# Patient Record
Sex: FEMALE | Race: WHITE | NOT HISPANIC OR LATINO | Employment: PART TIME | URBAN - METROPOLITAN AREA
[De-identification: names, ages, dates, MRNs, and addresses within clinical notes are randomized per-mention and may not be internally consistent; named-entity substitution may affect disease eponyms.]

---

## 2018-08-09 ENCOUNTER — AMBULATORY - HEALTHEAST (OUTPATIENT)
Dept: BEHAVIORAL HEALTH | Facility: CLINIC | Age: 48
End: 2018-08-09

## 2018-08-09 DIAGNOSIS — R41.9 COGNITIVE COMPLAINTS: ICD-10-CM

## 2018-08-10 ENCOUNTER — COMMUNICATION - HEALTHEAST (OUTPATIENT)
Dept: BEHAVIORAL HEALTH | Facility: CLINIC | Age: 48
End: 2018-08-10

## 2018-08-27 ENCOUNTER — COMMUNICATION - HEALTHEAST (OUTPATIENT)
Dept: BEHAVIORAL HEALTH | Facility: CLINIC | Age: 48
End: 2018-08-27

## 2018-08-29 ENCOUNTER — COMMUNICATION - HEALTHEAST (OUTPATIENT)
Dept: BEHAVIORAL HEALTH | Facility: CLINIC | Age: 48
End: 2018-08-29

## 2018-09-07 NOTE — TELEPHONE ENCOUNTER
FUTURE VISIT INFORMATION      FUTURE VISIT INFORMATION:    Date: 2018    Time: 8:00 am     [place] [facility]  [place] INFORMATION:    Referring provider:  Yasmany Trevino    RECORDS REQUESTED FROM:       Clinic name Comments Records Status Imaging Status   [facility]  Requested Records and Images on 2018 In- Coming  In-Coming    [facility]  Requested Records on 2018  Received  NA                              RECORDS STATUS        NOTES (FOR ALL VISITS) STATUS DETAILS   OFFICE NOTE from referring provider N/A    OFFICE NOTE from other specialist N/A    DISCHARGE SUMMARY from hospital N/A    DISCHARGE REPORT from the ER Care Everywhere 2018, 2014, 2013,    OPERATIVE REPORT N/A    MEDICATION LIST N/A    IMAGING  (FOR ALL VISITS)     EMG N/A    EEG Care Everywhere 2015  Elysia Cerna MD     3/3/2015 12:10 PM      MRN [id number] EEG DATE 2015   NAME Rohini Kay EEG #      1970       Requesting Provider Skyla Owen MD   Interpreting Physician Elysai Cerna MD    Read Date 3/3/2015       HISTORY Episodic confusion        MEDICATIONS Synthroid        DATA ACQUISITION This is a sleep-deprived, non-fasting scalp EEG   performed with video monitoring. The patient's data was acquired   digitally using 18 channels for EEG, 1 channel for EKG, and 2   channels for eye leads. Electrodes were placed using the   International 10-20 System of Electrode Placement. Condition:   good  Special Procedures: photic stimulation, hyperventilation        DESCRIPTION During the study, the patient was awake, alert,   drowsy, asleep.     Background rhythm is regular low to moderate amplitude, 9 Hz   alpha activity. The waveforms do attenuate with eye opening. The   waveforms are symmetric and synchronous.     The patient performs HV of 3 min duration with good effort does   not activate the record.  Photic stimulation of 1-25 hz does not   activate the  record.    The patient does transition to stage 1 sleep with loss of the   background rhythm and vertex sharp waves. Stage 2 sleep is not   seen. The patient does awaken later in the record with return to   alpha rhythms.     EKG rhythm is approximately 84 bpm. O2 sats are within normal   limits.     The patient demonstrates several intermittent abnormalities:  1. C3 sharp wave isolate at 09:12:05  2. High amplitude theta approximately 1 sec at 09:13:59  3. Intermittent sharp wave at T4 electrode, ex. At 09:15:37 and   09:19:46 early drowsiness.      EEG DIAGNOSIS This is an abnormal eeg. No epileptic activity was   seen. However, patient did have several epileptiform waveforms as   noted above. Although, this does not indicate epilepsy or   seizure, it could be a potential focus if clinically correlated.     ECT N/A    MRI (HEAD, NECK, SPINE) Care Everywhere  PACS  06/04/2012, 01/23/2015, 08/02/2018   CT (HEAD, NECK, SPINE) N/A    OTHER                 Medical Records Request For Appointment   (09/11/2018) Urgent!          To: [facility] From: Yrn Ivan  - Clinic Coordinator  [facility]   Fax: [phone] Fax: [phone]   Date: 09/11/2018 Phone: [phone]   Pages: 1 page  Mailing Address: [facility]  Attn: Yrn Ivan , Clinic Coordinator   [address] [place] [address], mail code [id number]  [place] [address]   Rohini Vo  MRN: [id number]  Female, 48 year old, 1970  fax medical records to fax # [phone] for patient s upcoming appointment in Neurology Clinic.      Patient is being seen for: visit regarding abnormal electropolygraphy, tired, symptoms involving cognitive functions and awareness per Pattie Mireles, referred by Yasmany Trevino, medical records at [facility] and [facility]      PLEASE SEND: TIME FRAME NEEDED:      Referring MD letter/note  2 Years     Related consuls, office notes 2 Years     ED/UC/Hospital  discharge notes 2 Years     Medication List Current     Neuro-Psych Testing   2 Years      Lumbar Puncture Test  2 Years     Neurodiagnostic Testing  2 Years     EEG/EMG/EKG/ECT (reports and tracing) 2 Years     Pertinent lab work  2 years      Genetic testing (if available) 2 Years     All imaging studies of the brain and spine (CT, MRI, XR, CTA, MRA, Carotid US)   Please send reports and images.    Please Push Images to PACS!!!!  Thanks Yrn Ivan Clinic Coordinator  5 Years     Please push images to [employer] PACS or mail the imaging disc  URGENT [employer] account #[id number] to the address above.  NON URGENT mail to the above address via [employer]        If no records related to Neurology, please send most recent lab work and physical.      Confidentiality Notice:  The document(s) accompanying this fax may contain protected health information under state and federal law and is legally privileged.  The information is only for the use of the intended recipient named above.  The recipient or person responsible for delivering this information is prohibited by law from disclosing this information without proper authorization to any other party, unless required to do so by law or regulation.  If you are not the intended recipient, you are hereby notified that any review, dissemination, distribution, or copying of this message is strictly prohibited.  If you have received this communication in error, please notify us immediately by telephone to arrange for the return or destruction of the faxed documents.  Thank you.

## 2018-09-11 ENCOUNTER — PRE VISIT (OUTPATIENT)
Dept: NEUROLOGY | Facility: CLINIC | Age: 48
End: 2018-09-11

## 2018-09-11 ENCOUNTER — OFFICE VISIT (OUTPATIENT)
Dept: NEUROLOGY | Facility: CLINIC | Age: 48
End: 2018-09-11
Payer: COMMERCIAL

## 2018-09-11 VITALS
SYSTOLIC BLOOD PRESSURE: 128 MMHG | BODY MASS INDEX: 20.32 KG/M2 | HEIGHT: 64 IN | HEART RATE: 103 BPM | WEIGHT: 119 LBS | OXYGEN SATURATION: 100 % | DIASTOLIC BLOOD PRESSURE: 87 MMHG | RESPIRATION RATE: 16 BRPM | TEMPERATURE: 98.1 F

## 2018-09-11 DIAGNOSIS — R41.0 CONFUSION: ICD-10-CM

## 2018-09-11 DIAGNOSIS — R41.0 CONFUSION: Primary | ICD-10-CM

## 2018-09-11 PROBLEM — E03.9 HYPOTHYROID: Status: ACTIVE | Noted: 2018-09-11

## 2018-09-11 PROBLEM — Z12.4 SCREENING FOR MALIGNANT NEOPLASM OF CERVIX: Status: ACTIVE | Noted: 2017-03-17

## 2018-09-11 PROBLEM — R63.0 ANOREXIA: Status: ACTIVE | Noted: 2017-04-06

## 2018-09-11 PROBLEM — K59.09 CHRONIC CONSTIPATION: Status: ACTIVE | Noted: 2017-04-06

## 2018-09-11 PROBLEM — R53.83 TIRED: Status: ACTIVE | Noted: 2017-04-06

## 2018-09-11 PROBLEM — T78.1XXA GASTROINTESTINAL FOOD SENSITIVITY: Status: ACTIVE | Noted: 2017-04-06

## 2018-09-11 PROBLEM — N92.1 MENORRHAGIA WITH IRREGULAR CYCLE: Status: ACTIVE | Noted: 2017-03-08

## 2018-09-11 PROBLEM — Z12.4 CERVICAL CANCER SCREENING: Status: ACTIVE | Noted: 2017-03-17

## 2018-09-11 PROBLEM — E06.3 HYPOTHYROIDISM DUE TO HASHIMOTO'S THYROIDITIS: Status: ACTIVE | Noted: 2017-03-29

## 2018-09-11 LAB
B BURGDOR IGG+IGM SER QL: 0.1 (ref 0–0.89)
VIT B12 SERPL-MCNC: 1322 PG/ML (ref 193–986)

## 2018-09-11 RX ORDER — OMEGA-3/DHA/EPA/FISH OIL 60 MG-90MG
1000 CAPSULE ORAL DAILY
COMMUNITY

## 2018-09-11 ASSESSMENT — ENCOUNTER SYMPTOMS
HOT FLASHES: 0
SMELL DISTURBANCE: 0
POLYDIPSIA: 1
COUGH: 0
HYPOTENSION: 0
SINUS CONGESTION: 0
WHEEZING: 0
FEVER: 1
NECK PAIN: 0
MUSCLE WEAKNESS: 1
JOINT SWELLING: 1
DECREASED LIBIDO: 0
HOARSE VOICE: 0
WEIGHT GAIN: 0
SEIZURES: 0
COUGH DISTURBING SLEEP: 0
SINUS PAIN: 0
PANIC: 0
SPUTUM PRODUCTION: 0
CHILLS: 1
SORE THROAT: 0
WEAKNESS: 1
HEMOPTYSIS: 0
NECK MASS: 0
TINGLING: 0
HEADACHES: 0
EXERCISE INTOLERANCE: 1
NERVOUS/ANXIOUS: 1
DIZZINESS: 1
FATIGUE: 1
DOUBLE VISION: 0
TROUBLE SWALLOWING: 0
INSOMNIA: 1
EYE REDNESS: 0
LEG PAIN: 0
BACK PAIN: 0
DEPRESSION: 1
NIGHT SWEATS: 1
ARTHRALGIAS: 1
ORTHOPNEA: 0
SWOLLEN GLANDS: 0
SKIN CHANGES: 0
DYSPNEA ON EXERTION: 0
MYALGIAS: 0
TASTE DISTURBANCE: 0
ALTERED TEMPERATURE REGULATION: 1
HYPERTENSION: 0
EYE PAIN: 0
SLEEP DISTURBANCES DUE TO BREATHING: 0
WEIGHT LOSS: 0
SHORTNESS OF BREATH: 1
POSTURAL DYSPNEA: 0
EYE IRRITATION: 0
STIFFNESS: 1
SYNCOPE: 0
INCREASED ENERGY: 1
DISTURBANCES IN COORDINATION: 1
SNORES LOUDLY: 0
MUSCLE CRAMPS: 0
LIGHT-HEADEDNESS: 1
TREMORS: 1
SPEECH CHANGE: 1
NAIL CHANGES: 0
DECREASED CONCENTRATION: 1
MEMORY LOSS: 1
DECREASED APPETITE: 1
POLYPHAGIA: 0
LOSS OF CONSCIOUSNESS: 0
BRUISES/BLEEDS EASILY: 1
POOR WOUND HEALING: 0
PALPITATIONS: 0
HALLUCINATIONS: 0
PARALYSIS: 0
NUMBNESS: 0
EYE WATERING: 0

## 2018-09-11 ASSESSMENT — PAIN SCALES - GENERAL: PAINLEVEL: NO PAIN (0)

## 2018-09-11 NOTE — NURSING NOTE
Depression Response    Patient completed the PHQ-9 assessment for depression and scored >9? Yes  Question 9 on the PHQ-9 was positive for suicidality? Yes  Is the patient already receiving treatment for depression? Yes  Patient would like to speak with behavioral health team ([facility] only)? No    I personally notified the following: visit provider    Behavioral Health/Social Work Contact Information     [facility]  Esteban Oliva MA, LMFT  Lead Behavioral Health Clinician  Phone: [phone]  [facility] Pager: [phone]    Non-[facility]  [facility] On-Call   Pager: [phone]

## 2018-09-11 NOTE — MR AVS SNAPSHOT
After Visit Summary   9/11/2018    Rohini Vo    MRN: [id number]           Patient Information     Date Of Birth          1970        Visit Information        Provider Department      9/11/2018 8:00 AM Hakan Davies MD [facility] Neurology        Today's Diagnoses     Confusion    -  1       Follow-ups after your visit        Additional Services     NEUROPSYCHOLOGY REFERRAL       Your provider has referred you to:  Neuropsychology  fro memory problems and also episode sof confusion  All scheduling is subject to the client's specific insurance plan & benefits, provider/location availability, and provider clinical specialities.  Please arrive 15 minutes early for your first appointment and bring your completed paperwork.    Please be aware that coverage of these services is subject to the terms and limitations of your health insurance plan.  Call member services at your health plan with any benefit or coverage questions.    Please bring the following to your appointment:  >>   Any x-rays, CTs or MRIs which have been performed.  Contact the facility where they were done to arrange for  prior to your scheduled appointment.  Any new CT, MRI or other procedures ordered by your specialist must be performed at a [facility] facility or coordinated by your clinic's referral office.    >>   List of current medications   >>   This referral request   >>   Any documents/labs given to you for this referral                  Follow-up notes from your care team     Return in about 3 months (around 12/11/2018).      Your next 10 appointments already scheduled     Sep 11, 2018  9:15 AM CDT   LAB with [facility])    [address] [place] [id number] [phone]           Please do not eat 10-12 hours before your appointment if you are coming in fasting for labs on lipids, cholesterol, or glucose (sugar). This does not apply to  pregnant women. Water, hot tea and black coffee (with nothing added) are okay. Do not drink other fluids, diet soda or chew gum.            Oct 10, 2018  8:00 AM CDT   (Arrive by 7:45 AM)   Ambulatory Hookup Visit with  EEG TECH 2   [facility] ([facility])    [address] [place] [address]   [phone]            Oct 11, 2018  8:00 AM CDT   (Arrive by 7:45 AM)   Ambulatory Discharge Visit with  EEG TECH 2   EE[facility] OUT[facility] ([facility])    [address] [place] [address]   [phone]            Dec 13, 2018  1:00 PM CST   (Arrive by 12:45 PM)   Return Visit with Hakan Davies MD   [facility] Neurology ([facility])    [address] [place] [id number] [phone]              Future tests that were ordered for you today     Open Future Orders        Priority Expected Expires Ordered    Thyroid peroxidase antibody Routine  9/11/2019 9/11/2018    Anti thyroglobulin antibody Routine  9/11/2019 9/11/2018    Vitamin B12 Routine  9/11/2019 9/11/2018    Angiotensin converting enzyme Routine  9/12/2019 9/11/2018    24 hour Ambulatory EEG (97707) Routine  9/11/2019 9/11/2018    Lyme Disease Samantha with reflex to WB Serum Routine  9/11/2019 9/11/2018            Who to contact     Please call your clinic at [phone] to:    Ask questions about your health    Make or cancel appointments    Discuss your medicines    Learn about your test results    Speak to your doctor            Additional Information About Your Visit        [employer] Information     [employer] is an electronic gateway that provides easy, online access to your medical records. With [employer]), you can request a clinic appointment, read your test results, renew a prescription or communicate with your care team.     To sign up for [employer] visit the website at [email or web address]   You will  "be asked to enter the access code listed below, as well as some personal information. Please follow the directions to create your username and password.     Your access code is: [email or web address]  Expires: 2018  6:31 AM     Your access code will  in 90 days. If you need help or a new code, please contact your [facility] or call [phone] for assistance.        Care EveryWhere ID     This is your Care EveryWhere ID. This could be used by other organizations to access your [facility] medical records  [id number]        Your Vitals Were     Pulse Temperature Respirations Height Pulse Oximetry Breastfeeding?    103 98.1  F (36.7  C) (Oral) 16 1.626 m (5' 4\") 100% No    BMI (Body Mass Index)                   20.43 kg/m2            Blood Pressure from Last 3 Encounters:   18 128/87    Weight from Last 3 Encounters:   18 54 kg (119 lb)              We Performed the Following     NEUROPSYCHOLOGY REFERRAL        Primary Care Provider Office Phone # Fax #    Mulu Trevino [phone]       [facility] [address] [place] [address]   [facility] [id number]        Equal Access to Services     JAVIER RESENDIZ AH: Hadii aad ku hadasho Soomaali, waaxda luqadaha, qaybta kaalmada adeegyada, waxay idiin hayadamarisn gonzález hutchisonarasin lakanikan ah. So [facility] [phone].    ATENCIÓN: Si habla español, tiene a mccauley disposición servicios gratuitos de asistencia lingüística. Llame al [phone].    We comply with applicable federal civil rights laws and [place] laws. We do not discriminate on the basis of race, color, national origin, age, disability, sex, sexual orientation, or gender identity.            Thank you!     Thank you for choosing [facility] NEUROLOGY  for your care. Our goal is always to provide you with excellent care. Hearing back from our patients is one way we can continue to improve our services. Please take a few minutes to complete the written survey that you may " receive in the mail after your visit with us. Thank you!             Your Updated Medication List - Protect others around you: Learn how to safely use, store and throw away your medicines at www.disposemymeds.org.          This list is accurate as of 9/11/18  9:00 AM.  Always use your most recent med list.                   Brand Name Dispense Instructions for use Diagnosis    ASHWAGANDHA PO      Take 800 mg by mouth    Confusion       B Complex Tabs      Take by mouth daily    Confusion       CHELATED IRON PO       Confusion       DIGESTIVE ENZYMES PO       Confusion       Fish Oil 500 MG Caps      1,000 mg daily    Confusion       MULTI VITAMIN PO      Take by mouth daily    Confusion       NATURE-THROID 130 MG Tabs   Generic drug:  Thyroid      Take 130 mg by mouth daily    Confusion

## 2018-09-11 NOTE — PROGRESS NOTES
Answers for HPI/ROS submitted by the patient on 9/11/2018   General Symptoms: Yes  Skin Symptoms: Yes  HENT Symptoms: Yes  EYE SYMPTOMS: Yes  HEART SYMPTOMS: Yes  LUNG SYMPTOMS: Yes  INTESTINAL SYMPTOMS: No  URINARY SYMPTOMS: No  GYNECOLOGIC SYMPTOMS: Yes  BREAST SYMPTOMS: No  SKELETAL SYMPTOMS: Yes  BLOOD SYMPTOMS: Yes  NERVOUS SYSTEM SYMPTOMS: Yes  MENTAL HEALTH SYMPTOMS: Yes  Fever: Yes  Loss of appetite: Yes  Weight loss: No  Weight gain: No  Fatigue: Yes  Night sweats: Yes  Chills: Yes  Increased stress: Yes  Excessive hunger: No  Excessive thirst: Yes  Feeling hot or cold when others believe the temperature is normal: Yes  Loss of height: No  Post-operative complications: No  Surgical site pain: No  Hallucinations: No  Change in or Loss of Energy: Yes  Hyperactivity: No  Confusion: Yes  Changes in hair: No  Changes in moles/birth marks: No  Itching: No  Rashes: No  Changes in nails: No  Acne: No  Hair in places you don't want it: No  Change in facial hair: No  Warts: No  Non-healing sores: No  Scarring: No  Flaking of skin: No  Color changes of hands/feet in cold : Yes  Sun sensitivity: Yes  Skin thickening: No  Ear pain: No  Ear discharge: No  Hearing loss: No  Tinnitus: Yes  Nosebleeds: No  Congestion: No  Sinus pain: No  Trouble swallowing: No   Voice hoarseness: No  Mouth sores: No  Sore throat: No  Tooth pain: No  Gum tenderness: No  Bleeding gums: No  Change in taste: No  Change in sense of smell: No  Dry mouth: Yes  Hearing aid used: No  Neck lump: No  Eye pain: No  Vision loss: No  Dry eyes: Yes  Watery eyes: No  Eye bulging: No  Double vision: No  Flashing of lights: No  Spots: No  Floaters: Yes  Redness: No  Crossed eyes: No  Tunnel Vision: No  Yellowing of eyes: No  Eye irritation: No  Cough: No  Sputum or phlegm: No  Coughing up blood: No  Difficulty breating or shortness of breath: Yes  Snoring: No  Wheezing: No  Difficulty breathing on exertion: No  Nighttime Cough: No  Difficulty breathing  when lying flat: No  Chest pain or pressure: Yes  Fast or irregular heartbeat: No  Pain in legs with walking: No  Trouble breathing while lying down: No  Fingers or toes appear blue: Yes  High blood pressure: No  Low blood pressure: No  Fainting: No  Murmurs: No  Pacemaker: No  Varicose veins: No  Edema or swelling: No  Wake up at night with shortness of breath: No  Light-headedness: Yes  Exercise intolerance: Yes  Back pain: No  Muscle aches: No  Neck pain: No  Swollen joints: Yes  Joint pain: Yes  Bone pain: No  Muscle cramps: No  Muscle weakness: Yes  Joint stiffness: Yes  Bone fracture: No  Anemia: No  Swollen glands: No  Easy bleeding or bruising: Yes  Trouble with coordination: Yes  Dizziness or trouble with balance: Yes  Fainting or black-out spells: No  Memory loss: Yes  Headache: No  Seizures: No  Speech problems: Yes  Tingling: No  Tremor: Yes  Weakness: Yes  Difficulty walking: No  Paralysis: No  Numbness: No  Bleeding or spotting between periods: No  Heavy or painful periods: Yes  Irregular periods: Yes  Vaginal discharge: No  Hot flashes: No  Vaginal dryness: No  Genital ulcers: No  Reduced libido: No  Painful intercourse: No  Difficulty with sexual arousal: No  Post-menopausal bleeding: No  Nervous or Anxious: Yes  Depression: Yes  Trouble sleeping: Yes  Trouble thinking or concentrating: Yes  Mood changes: Yes  Panic attacks: No  PHQ-2 Score: 2

## 2018-09-11 NOTE — NURSING NOTE
Chief Complaint   Patient presents with     Consult     [facility] NEW VISIT REGARDING ABNORMAL ELECTROPOLYGRAPHY, FATIGUE AND COGNITIVE/AWARENESS ISSUES.     Zeferino Mcintosh, EMT

## 2018-09-11 NOTE — LETTER
"2018       RE: Rohini Vo  [address]  [place] [address]     Dear Colleague,    Thank you for referring your patient, Rohini Vo, to the [facility] NEUROLOGY at [facility]. Please see a copy of my visit note below.      Service Date: 2018      Mulu Trevino MD   [facility]    [address] [place] [address]      RE: Rohini Vo   MRN: [id number]   : 1970      Dear Dr. Trevino:       This 48-year-old, very pleasant woman, accompanied by her mother, was evaluated in the General Neurology Clinic at the [facility] because of episodes, she says, since  of \"brain spells.\"      These episodes, she says, may last several hours.  The mother who is with her says she appears at times confused and not responding appropriately.  During this time, she says she has an inability to remember things.  She is able to walk and talk, but they are associated sometimes with muscle weakness and fatigue and may last up to 1 day, but as long as a week.  She forgets lists.  She has been incontinent at that time and cannot react appropriately.  They have been low grade.  Forgetfulness is also in between these episodes.  She has seen a neurologist, Dr. Skyla Owen in [place], and Dr. 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  She also has Hashimoto thyroiditis and is seen by Endocrinology here and is on a workup for possible also immune disease and saw Rheumatology.  She said she had an EEG in  at the [facility], which was abnormal, and she has been ___ there on .  She apparently has had what she says is a personality change, and she cannot focus.  She is irritable and depressed.  She has a history of a suicide attempt as a teenager and was briefly hospitalized, and " after that, she has intermittently suffered from depression, but without any suicidal history.  She is currently scheduled to see a therapist.      As far as seizures, she has no history of seizures in childhood or febrile convulsion.  She has not had any meningitis, encephalitis or head injury.  None of these episodes have been associated with tongue biting, tonic-clonic problems or any overt seizure activity.  The only medication she has now is B complex.      PAST MEDICAL HISTORY:  She carries diagnoses of abnormal EEG, hypothyroidism, anorexia, chronic constipation, GI food sensitivity, tiredness, hypothyroidism due to Hashimoto thyroiditis, malignant neoplasm of the cervix, menorrhagia with irregular cycles, cognitive changes.      CURRENT MEDICATIONS:  B complex, chelate iron, digestive enzyme, multivitamin, omega 3 and thyroid.        ALLERGIES:  Unknown to the patient or not listed.      FAMILY HISTORY:  Negative for neurological problems.      REVIEW OF SYSTEMS:  She was told as a teenager because of cold hands that she had Raynaud phenomenon.  This was not treated.      SOCIAL HISTORY:  She is working, but has difficulty because of the above symptoms.  She has a daughter who is 27 years old.  She is attending to the illness of a very close, best friend of hers, who has terminal emphysema of the lungs.        REVIEW OF SYSTEMS:  Not significant for items listed.      PHYSICAL EXAMINATION:  She is a pleasant woman.  Blood pressure is 120/87.  Pulse is 103.  Mental status exam looks very normal.  She denies any weight loss of significance lately.      Her cranial nerve exam is normal.  Fundi are well visualized and normal.  Normal eye movements.  Face is symmetrical.  Neck is supple.  There is good coordination of arms and legs.  She is left-handed.  Reflexes are obtainable, symmetrical.  Plantars are flexor.  Sensory exam is normal.      Gait and station are unremarkable.      In summary, she has a normal  neurological exam.  She complains of these episodes of confusion and memory loss.  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  Further review of outside records needs to be done.  She has had apparently an abnormal EEG.      That would be a concern for a possibility of seizures even though these episodes do not last too long a period of time.  For cognitive changes, we will need to set timing with neuropsych testing; we will try to schedule that.  She is scheduled for an ambulatory EEG to capture these episodes, neuropsych testing, and we will proceed with some laboratory studies, including vitamin B12 level, angiotensin converting enzyme, Lyme test and repeat her thyroid antibody profile.        We will see her for followup after the above.            D: 2018   T: 2018   MT: sandra      Name:     ELIAS WINSTON   MRN:      [id number]        Account:      [id number]   :      1970           Service Date: 2018      Document: [id number]       Again, thank you for allowing me to participate in the care of your patient.      Sincerely,    Hakan Davies MD

## 2018-09-11 NOTE — NURSING NOTE
PT. HAS BEEN EXPERIENCING DEPRESSION AND SUICIDAL THOUGHTS SINCE EXPERIENCING HER SYMPTOMS. PT STATES SHE IS PLANNING ON SEEING A THERAPIST SOON.     Zeferino Mcintosh, EMT

## 2018-09-12 LAB
ACE SERPL-CCNC: 29 U/L (ref 9–67)
THYROGLOB AB SERPL IA-ACNC: <20 IU/ML (ref 0–40)
THYROPEROXIDASE AB SERPL-ACNC: 111 IU/ML

## 2018-09-12 ASSESSMENT — PATIENT HEALTH QUESTIONNAIRE - PHQ9: SUM OF ALL RESPONSES TO PHQ QUESTIONS 1-9: 17

## 2018-09-17 NOTE — PROGRESS NOTES
"Service Date: 2018      Mulu Trevino MD   [facility]    [address] [place] [address]      RE: Rohini Vo   MRN: [id number]   : 1970      Dear Dr. Trevino:       This 48-year-old, very pleasant woman, accompanied by her mother, was evaluated in the General Neurology Clinic at the [facility] because of episodes, she says, since  of \"brain spells.\"      These episodes, she says, may last several hours.  The mother who is with her says she appears at times confused and not responding appropriately.  During this time, she says she has an inability to remember things.  She is able to walk and talk, but they are associated sometimes with muscle weakness and fatigue and may last up to 1 day, but as long as a week.  She forgets lists.  She has been incontinent at that time and cannot react appropriately.  They have been low grade.  Forgetfulness is also in between these episodes.  She has seen a neurologist, Dr. Skyla Owen in [place], and Dr. 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  She also has Hashimoto thyroiditis and is seen by Endocrinology here and is on a workup for possible also immune disease and saw Rheumatology.  She said she had an EEG in  at the [facility], which was abnormal, and she has been ___ there on .  She apparently has had what she says is a personality change, and she cannot focus.  She is irritable and depressed.  She has a history of a suicide attempt as a teenager and was briefly hospitalized, and after that, she has intermittently suffered from depression, but without any suicidal history.  She is currently scheduled to see a therapist.      As far as seizures, she has no history of seizures in childhood or febrile convulsion.  She has not had any meningitis, encephalitis or " head injury.  None of these episodes have been associated with tongue biting, tonic-clonic problems or any overt seizure activity.  The only medication she has now is B complex.      PAST MEDICAL HISTORY:  She carries diagnoses of abnormal EEG, hypothyroidism, anorexia, chronic constipation, GI food sensitivity, tiredness, hypothyroidism due to Hashimoto thyroiditis, malignant neoplasm of the cervix, menorrhagia with irregular cycles, cognitive changes.      CURRENT MEDICATIONS:  B complex, chelate iron, digestive enzyme, multivitamin, omega 3 and thyroid.        ALLERGIES:  Unknown to the patient or not listed.      FAMILY HISTORY:  Negative for neurological problems.      REVIEW OF SYSTEMS:  She was told as a teenager because of cold hands that she had Raynaud phenomenon.  This was not treated.      SOCIAL HISTORY:  She is working, but has difficulty because of the above symptoms.  She has a daughter who is 27 years old.  She is attending to the illness of a very close, best friend of hers, who has terminal emphysema of the lungs.        REVIEW OF SYSTEMS:  Not significant for items listed.      PHYSICAL EXAMINATION:  She is a pleasant woman.  Blood pressure is 120/87.  Pulse is 103.  Mental status exam looks very normal.  She denies any weight loss of significance lately.      Her cranial nerve exam is normal.  Fundi are well visualized and normal.  Normal eye movements.  Face is symmetrical.  Neck is supple.  There is good coordination of arms and legs.  She is left-handed.  Reflexes are obtainable, symmetrical.  Plantars are flexor.  Sensory exam is normal.      Gait and station are unremarkable.      In summary, she has a normal neurological exam.  She complains of these episodes of confusion and memory loss.  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  Further review of outside records needs to be done.  She has had apparently an abnormal  EEG.      That would be a concern for a possibility of seizures even though these episodes do not last too long a period of time.  For cognitive changes, we will need to set timing with neuropsych testing; we will try to schedule that.  She is scheduled for an ambulatory EEG to capture these episodes, neuropsych testing, and we will proceed with some laboratory studies, including vitamin B12 level, angiotensin converting enzyme, Lyme test and repeat her thyroid antibody profile.        We will see her for followup after the above.      Sincerely,      MD AISHWARYA Huff MD             D: 2018   T: 2018   MT: sandra      Name:     ELIAS WINSTON   MRN:      [id number]        Account:      [id number]   :      1970           Service Date: 2018      Document: [id number]

## 2018-10-10 ENCOUNTER — ALLIED HEALTH/NURSE VISIT (OUTPATIENT)
Dept: NEUROLOGY | Facility: CLINIC | Age: 48
End: 2018-10-10
Payer: COMMERCIAL

## 2018-10-10 DIAGNOSIS — R41.0 CONFUSION: ICD-10-CM

## 2018-10-10 NOTE — Clinical Note
Amb study hooked up this morning   It will be ready for interpretation tomorrow morning  Thanks  Sulema

## 2018-10-10 NOTE — MR AVS SNAPSHOT
After Visit Summary   10/10/2018    Rohini Vo    MRN: [id number]           Patient Information     Date Of Birth          1970        Visit Information        Provider Department      10/10/2018 8:00 AM [facility] EEG TECH 2 EEG [facility] OUTPATIENT        Today's Diagnoses     Confusion           Follow-ups after your visit        Your next 10 appointments already scheduled     Dec 13, 2018  1:00 PM CST   (Arrive by 12:45 PM)   Return Visit with Hakan Davies MD   [facility] Neurology ([facility] [place])    [address] [place] [id number] [phone]              Who to contact     Please call your clinic at [phone] to:    Ask questions about your health    Make or cancel appointments    Discuss your medicines    Learn about your test results    Speak to your doctor            Additional Information About Your Visit        MyChart Information     [employer] is an electronic gateway that provides easy, online access to your medical records. With [employer], you can request a clinic appointment, read your test results, renew a prescription or communicate with your care team.     To sign up for [employer] visit the website at [email or web address]   You will be asked to enter the access code listed below, as well as some personal information. Please follow the directions to create your username and password.     Your access code is: [email or web address]  Expires: 2019  7:43 AM     Your access code will  in 90 days. If you need help or a new code, please contact your [facility] or call [phone] for assistance.        Care EveryWhere ID     This is your Care EveryWhere ID. This could be used by other organizations to access your [place] medical records  [id number]         Blood Pressure from Last 3 Encounters:   No data found for BP    Weight from Last 3 Encounters:   No data found for Wt              Today, you had the  following     No orders found for display       Primary Care Provider Office Phone # Fax #    Mulu Trevino [phone]       [address]  [place] [address]        Equal Access to Services     GEOFFREY RESENDIZ : Hadii aad ku hadamberlyo Soomaali, waaxda luqadaha, qaybta kaalmada adeegyada, malik castillon gonzález cervantes laKaycarmela baxter. So [facility] [phone].    ATENCIÓN: Si habla español, tiene a mccauley disposición servicios gratuitos de asistencia lingüística. Llame al [phone].    We comply with applicable federal civil rights laws and [place] laws. We do not discriminate on the basis of race, color, national origin, age, disability, sex, sexual orientation, or gender identity.            Thank you!     Thank you for choosing [facility]  for your care. Our goal is always to provide you with excellent care. Hearing back from our patients is one way we can continue to improve our services. Please take a few minutes to complete the written survey that you may receive in the mail after your visit with us. Thank you!             Your Updated Medication List - Protect others around you: Learn how to safely use, store and throw away your medicines at www.disposemymeds.org.          This list is accurate as of 10/10/18 11:59 PM.  Always use your most recent med list.                   Brand Name Dispense Instructions for use Diagnosis    ASHWAGANDHA PO      Take 800 mg by mouth    Confusion       B Complex Tabs      Take by mouth daily    Confusion       CHELATED IRON PO       Confusion       DIGESTIVE ENZYMES PO       Confusion       Fish Oil 500 MG Caps      1,000 mg daily    Confusion       MULTI VITAMIN PO      Take by mouth daily    Confusion       NATURE-THROID 130 MG Tabs   Generic drug:  Thyroid      Take 130 mg by mouth daily    Confusion

## 2018-10-11 ENCOUNTER — ALLIED HEALTH/NURSE VISIT (OUTPATIENT)
Dept: NEUROLOGY | Facility: CLINIC | Age: 48
End: 2018-10-11
Payer: COMMERCIAL

## 2018-10-11 DIAGNOSIS — R94.01 ABNORMAL EEG: Primary | ICD-10-CM

## 2018-10-11 NOTE — MR AVS SNAPSHOT
After Visit Summary   10/11/2018    Rohini Vo    MRN: [id number]           Patient Information     Date Of Birth          1970        Visit Information        Provider Department      10/11/2018 8:00 AM  EEG TECH 2 EEG [facility] OUTPATIENT        Today's Diagnoses     Abnormal EEG    -  1       Follow-ups after your visit        Your next 10 appointments already scheduled     Dec 13, 2018  1:00 PM CST   (Arrive by 12:45 PM)   Return Visit with Hakan Davies MD   [facility] Neurology ([facility] [place])    [address] [place] [address] [phone]              Who to contact     Please call your clinic at [phone] to:    Ask questions about your health    Make or cancel appointments    Discuss your medicines    Learn about your test results    Speak to your doctor            Additional Information About Your Visit        MyChart Information     [employer] is an electronic gateway that provides easy, online access to your medical records. With [employer], you can request a clinic appointment, read your test results, renew a prescription or communicate with your care team.     To sign up for [employer] visit the website at [email or web address]   You will be asked to enter the access code listed below, as well as some personal information. Please follow the directions to create your username and password.     Your access code is: [email or web address]  Expires: 2019  7:43 AM     Your access code will  in 90 days. If you need help or a new code, please contact your [facility] or call [phone] for assistance.        Care EveryWhere ID     This is your Care EveryWhere ID. This could be used by other organizations to access your [place] medical records  [id number]         Blood Pressure from Last 3 Encounters:   No data found for BP    Weight from Last 3 Encounters:   No data found for Wt              Today, you  had the following     No orders found for display       Primary Care Provider Office Phone # Fax #    Mulu Trevino [phone]       [address]  [place] [address]        Equal Access to Services     GEOFFREY RESENDIZ : Hadii lucio ku hadamberlyo Soomaali, waaxda luqadaha, qaybta kaalmada adeegyada, malik arlethin hayaan guillerashad cervantes jose a baxter. So [facility] [phone].    ATENCIÓN: Si habla español, tiene a mccauley disposición servicios gratuitos de asistencia lingüística. [facility] [phone].    We comply with applicable federal civil rights laws and [place] laws. We do not discriminate on the basis of race, color, national origin, age, disability, sex, sexual orientation, or gender identity.            Thank you!     Thank you for choosing [facility]  for your care. Our goal is always to provide you with excellent care. Hearing back from our patients is one way we can continue to improve our services. Please take a few minutes to complete the written survey that you may receive in the mail after your visit with us. Thank you!             Your Updated Medication List - Protect others around you: Learn how to safely use, store and throw away your medicines at www.disposemymeds.org.          This list is accurate as of 10/11/18 11:59 PM.  Always use your most recent med list.                   Brand Name Dispense Instructions for use Diagnosis    ASHWAGANDHA PO      Take 800 mg by mouth    Confusion       B Complex Tabs      Take by mouth daily    Confusion       CHELATED IRON PO       Confusion       DIGESTIVE ENZYMES PO       Confusion       Fish Oil 500 MG Caps      1,000 mg daily    Confusion       MULTI VITAMIN PO      Take by mouth daily    Confusion       NATURE-THROID 130 MG Tabs   Generic drug:  Thyroid      Take 130 mg by mouth daily    Confusion

## 2018-10-12 NOTE — PROCEDURES
[facility] EEG #-1 (Day 1 of Ambulatory EEG Recording)    Name:     Rohini Vo   MRN: [id number]   : 1970   Procedure Date: 10/10/2018  Duration of Recordin hours, 47 minutes.      CLINICAL SUMMARY:  This diagnostic ambulatory EEG monitoring procedure was performed in evaluation of encephalopathy with episodic amnesia in Rohini Vo.  She reportedly did not receive anti-seizure medication during the period of monitoring.      TECHNICAL SUMMARY:  This continuous EEG monitoring procedure was performed with 23 scalp electrodes in 10-20 system placements, and additional scalp, precordial and other surface electrodes used for electrical referencing and artifact detection.      INTERICTAL EEG ACTIVITIES:  During maximal waking, there was a symmetric, well-modulated, approximately 10 Hz posterior dominant rhythm, which was attenuated on eye opening.  Lower amplitude faster activities predominated anteriorly.  Drowsiness was manifested by predominance of centrally maximum semirhythmic theta slowing and dropout of the posterior dominant rhythm during deeper drowsiness.  Symmetric sleep spindles were seen during slow wave sleep.      No interictal epileptiform abnormalities were recorded.    ICTAL RECORDINGS:  No electrographic seizures occurred during this procedure.     EVENT MARKINGS:  The patient activated the [employer] event marking system once during the period of monitoring.  At 01:13:35, she marked an event that she reported as being awakened by a jerk and being unable to fall asleep again.  The EEG recording at this time showed a sudden arousal from slow wave sleep into full waking, with sustained waking EEG activities for many minutes.  The patient did have other arousals from slow wave sleep during her overnight sleep period, but these were not marked by the patient and waking EEG activities were quite brief, well under 1 minute, before resumption of  drowsiness and slow wave sleep in these other instances.     SUMMARY OF AMBULATORY EEG MONITORING:    The interictal EEG recording was normal in waking, drowsiness and slow wave sleep.  No pathological slowing, no interictal epileptiform abnormalities and no electrographic seizures were recorded.    The patient marked 1 arousal from slow wave sleep, which was associated with sustained occurrence of waking EEG activities shortly after 1 a.m.; other arousals from slow wave sleep showed only brief waking EEG activities and were not marked by the patient.  Clinical correlation is recommended.   Maxim Vargas M.D., Professor of Neurology        D: 10/11/2018   T: 10/11/2018   MT: KACEY      Name:     ELIAS WINSTON   MRN:      [id number]        Account:        [id number]   :      1970           Procedure Date: 10/10/2018      Document: [id number]

## 2018-12-13 ENCOUNTER — OFFICE VISIT (OUTPATIENT)
Dept: NEUROLOGY | Facility: CLINIC | Age: 48
End: 2018-12-13
Payer: COMMERCIAL

## 2018-12-13 VITALS
WEIGHT: 123 LBS | TEMPERATURE: 98 F | SYSTOLIC BLOOD PRESSURE: 123 MMHG | DIASTOLIC BLOOD PRESSURE: 69 MMHG | BODY MASS INDEX: 21 KG/M2 | RESPIRATION RATE: 16 BRPM | OXYGEN SATURATION: 98 % | HEIGHT: 64 IN | HEART RATE: 99 BPM

## 2018-12-13 DIAGNOSIS — G93.41 METABOLIC ENCEPHALOPATHY: ICD-10-CM

## 2018-12-13 DIAGNOSIS — G93.41 METABOLIC ENCEPHALOPATHY: Primary | ICD-10-CM

## 2018-12-13 PROBLEM — R76.8 ANA POSITIVE: Status: ACTIVE | Noted: 2018-10-03

## 2018-12-13 RX ORDER — THYROID 90 MG/1
90 TABLET ORAL
COMMUNITY
Start: 2018-11-01

## 2018-12-13 RX ORDER — THYROID 15 MG/1
15 TABLET ORAL
COMMUNITY
Start: 2018-11-01

## 2018-12-13 RX ORDER — LEVOTHYROXINE, LIOTHYRONINE 57; 13.5 UG/1; UG/1
TABLET ORAL
Refills: 0 | COMMUNITY
Start: 2018-11-05

## 2018-12-13 ASSESSMENT — PAIN SCALES - GENERAL: PAINLEVEL: NO PAIN (0)

## 2018-12-13 ASSESSMENT — MIFFLIN-ST. JEOR: SCORE: 1167.92

## 2018-12-13 NOTE — NURSING NOTE
Chief Complaint   Patient presents with     RECHECK     [facility] RETURN - 3 month     Paris Robison, CMA

## 2018-12-13 NOTE — LETTER
2018       RE: Rohini Vo  [address]  [facility] [address]     Dear Colleague,    Thank you for referring your patient, Rohini Vo, to the [facility] NEUROLOGY at [facility]. Please see a copy of my visit note below.    Service Date: 2018      Mulu Trevino MD   [facility]   [address] [place] [address]      RE: Rohini Vo   MRN: [id number]   : 1970      Dear Dr. Trevino:      Once more we saw Ms. Rohini Vo, accompanied by her mother.  She had been evaluated neurologically on  with a history of episodes of confusion, memory loss and she had been evaluated also for forgetfulness and some rare incontinence.  She had been seen in [place] by Dr. Skyla Owen, and she has very high antithyroid antibody and the TPO, and she was given a trial of prednisone for Hashimoto's encephalopathy and she has a history of Hashimoto thyroiditis.  An immune workup has shown very high antinuclear antibody 1:1000.  She had a personality change.  She is irritable and depressed and has had a history of a suicide attempt, however, in high school years.  There has been no history of seizures, febrile convulsion, meningitis, encephalitis.  She has had an abnormal EEG on the outside which apparently they found some sharp waves.      When I saw her in the clinic in September, her neurological examination was normal.  She had episodes of confusion, memory loss and we did a 24-hour ambulatory EEG which was completely normal.  Neuropsych testing and laboratory studies were to follow.  She had a Lyme titer, B12, ACE level, all of which came back normal.  She has been seen by Rheumatology, with a positive VIKTOR and they also diagnosed her with microscopic colitis.  An MRI has been normal.  But 1 EEG in October at 3 hours has shown seizure activity.  She had a history is issues with digestion and had microscopic colitis  diagnosed.  Rheumatology's conclusion was that she had a history of anxiety, depression, Hashimoto's, hypothyroidism, microscopic colitis and celiac insensitivity and positive VIKTOR, SS-B, SM, RNP, dsDNA, C4 and normal.  There was no evidence of inflammatory arthritis, lupus rashes and ___ normal workup.  The final diagnosis was VIKTOR positivity and no indications to start therapy.  Patient returns and reports that these episodes of confusion are better.  The mother says also that they are better, but she is still not well.  She has gone back to work.  She has never had any really generalized convulsion.      She has not had a lumbar puncture done.  We reviewed the EEG with her which was normal.  We reexamined her.      PHYSICAL EXAMINATION:  She is a very pleasant individual.  She has a peculiar ability that she can turn her tongue on its side and so does also mother, who demonstrated to me and a grandmother.  Her blood pressure was 123/69, the O2 sats 98% and the weight is 123.  She is a very pleasant woman.  Eye movements are normal.  Fundus is normal.  There is no tremor.  Good coordination.  Brisk reflexes throughout.  No cranial nerve finding.  Generally, a normal neurological exam.      In summary, we have a patient complains of confusion and episodes, who actually has been improving.  Her TPO titer was only 111.  She does have significant elevated TSH and primary hypothyroidism.  She has very strong antinuclear antibody 1:1000.      We will repeat the TPO titer and the thyroid antiglobulin as well.  We considered a spinal fluid examination to rule out some central nervous system process.  She said she had a spider bite in her neck so we will check her for ehrlichiosis.  I will let her know fairly soon if she needs a spinal tap.  She has had a trial of steroids with no response.  We will let her know.      Sincerely,         Hakan Davies MD

## 2018-12-14 LAB — THYROPEROXIDASE AB SERPL-ACNC: 111 IU/ML

## 2018-12-15 LAB
A PHAGOCYTOPH IGG TITR SER IF: NORMAL {TITER}
A PHAGOCYTOPH IGM TITR SER IF: NORMAL {TITER}

## 2018-12-17 ENCOUNTER — TELEPHONE (OUTPATIENT)
Dept: NEUROLOGY | Facility: CLINIC | Age: 48
End: 2018-12-17

## 2018-12-17 NOTE — PROGRESS NOTES
Service Date: 2018      Mulu Trevino MD   [address] [address]      RE: Rohini Vo   MRN: [id number]   : 1970      Dear Dr. Trevino:      Once more we saw Ms. Rohini Vo, accompanied by her mother.  She had been evaluated neurologically on  with a history of episodes of confusion, memory loss and she had been evaluated also for forgetfulness and some rare incontinence.  She had been seen in [place] by Dr. Skyla Owen, and she has very high antithyroid antibody and the TPO, and she was given a trial of prednisone for Hashimoto's encephalopathy and she has a history of Hashimoto thyroiditis.  An immune workup has shown very high antinuclear antibody 1:1000.  She had a personality change.  She is irritable and depressed and has had a history of a suicide attempt, however, in high school years.  There has been no history of seizures, febrile convulsion, meningitis, encephalitis.  She has had an abnormal EEG on the outside which apparently they found some sharp waves.      When I saw her in the clinic in September, her neurological examination was normal.  She had episodes of confusion, memory loss and we did a 24-hour ambulatory EEG which was completely normal.  Neuropsych testing and laboratory studies were to follow.  She had a Lyme titer, B12, ACE level, all of which came back normal.  She has been seen by Rheumatology, with a positive VIKTOR and they also diagnosed her with microscopic colitis.  An MRI has been normal.  But 1 EEG in October at 3 hours has shown seizure activity.  She had a history is issues with digestion and had microscopic colitis diagnosed.  Rheumatology's conclusion was that she had a history of anxiety, depression, Hashimoto's, hypothyroidism, microscopic colitis and celiac insensitivity and positive VIKTOR, SS-B, SM, RNP, dsDNA, C4 and normal.  There was no evidence of inflammatory arthritis, lupus rashes and ___  normal workup.  The final diagnosis was VIKTOR positivity and no indications to start therapy.  Patient returns and reports that these episodes of confusion are better.  The mother says also that they are better, but she is still not well.  She has gone back to work.  She has never had any really generalized convulsion.      She has not had a lumbar puncture done.  We reviewed the EEG with her which was normal.  We reexamined her.      PHYSICAL EXAMINATION:  She is a very pleasant individual.  She has a peculiar ability that she can turn her tongue on its side and so does also mother, who demonstrated to me and a grandmother.  Her blood pressure was 123/69, the O2 sats 98% and the weight is 123.  She is a very pleasant woman.  Eye movements are normal.  Fundus is normal.  There is no tremor.  Good coordination.  Brisk reflexes throughout.  No cranial nerve finding.  Generally, a normal neurological exam.      In summary, we have a patient complains of confusion and episodes, who actually has been improving.  Her TPO titer was only 111.  She does have significant elevated TSH and primary hypothyroidism.  She has very strong antinuclear antibody 1:1000.      We will repeat the TPO titer and the thyroid antiglobulin as well.  We considered a spinal fluid examination to rule out some central nervous system process.  She said she had a spider bite in her neck so we will check her for ehrlichiosis.  I will let her know fairly soon if she needs a spinal tap.  She has had a trial of steroids with no response.  We will let her know.      Sincerely,         MD AISHWARYA Huff MD             D: 2018   T: 2018   MT: al      Name:     ELIAS WINSTON   MRN:      [id number]        Account:      [id number]   :      1970           Service Date: 2018      Document: [id number]

## 2018-12-18 ENCOUNTER — TELEPHONE (OUTPATIENT)
Dept: NEUROLOGY | Facility: CLINIC | Age: 48
End: 2018-12-18

## 2018-12-18 DIAGNOSIS — G93.40 ENCEPHALOPATHY: Primary | ICD-10-CM

## 2018-12-18 NOTE — TELEPHONE ENCOUNTER
Spoke with patient about LP and if she had any prior back surgeries or any issues related to her spine/back in the past. Patient denied this. I asked if she takes any blood thinners or is on aspirin and she confirmed she takes aspirin. I asked her to stop taking that today (needs to be off this 5 days prior to), she confirmed her understanding. I scheduled patient to come in to see Bredna for the LP on 12/21/2018 at 0930.

## 2018-12-18 NOTE — TELEPHONE ENCOUNTER
Asked the patient to come in 30 minutes prior to and have her blood drawn in lab for her platelets, INR and PTT prior to LP. Patient confirmed her understanding.

## 2018-12-21 ENCOUNTER — OFFICE VISIT (OUTPATIENT)
Dept: NEUROLOGY | Facility: CLINIC | Age: 48
End: 2018-12-21
Payer: COMMERCIAL

## 2018-12-21 VITALS
RESPIRATION RATE: 16 BRPM | BODY MASS INDEX: 20.49 KG/M2 | SYSTOLIC BLOOD PRESSURE: 120 MMHG | HEART RATE: 73 BPM | DIASTOLIC BLOOD PRESSURE: 50 MMHG | TEMPERATURE: 98 F | WEIGHT: 120 LBS | OXYGEN SATURATION: 100 % | HEIGHT: 64 IN

## 2018-12-21 DIAGNOSIS — G93.40 ENCEPHALOPATHY: ICD-10-CM

## 2018-12-21 LAB
APPEARANCE CSF: CLEAR
APTT PPP: 25 SEC (ref 22–37)
COLOR CSF: COLORLESS
ERYTHROCYTE [DISTWIDTH] IN BLOOD BY AUTOMATED COUNT: 13.7 % (ref 10–15)
GLUCOSE CSF-MCNC: 57 MG/DL (ref 40–70)
HCT VFR BLD AUTO: 41.7 % (ref 35–47)
HGB BLD-MCNC: 13.5 G/DL (ref 11.7–15.7)
INR PPP: 0.92 (ref 0.86–1.14)
MCH RBC QN AUTO: 31 PG (ref 26.5–33)
MCHC RBC AUTO-ENTMCNC: 32.4 G/DL (ref 31.5–36.5)
MCV RBC AUTO: 96 FL (ref 78–100)
PLATELET # BLD AUTO: 225 10E9/L (ref 150–450)
PROT CSF-MCNC: 30 MG/DL (ref 15–60)
RBC # BLD AUTO: 4.36 10E12/L (ref 3.8–5.2)
RBC # CSF MANUAL: 0 /UL (ref 0–2)
TUBE # CSF: 3 #
WBC # BLD AUTO: 5.1 10E9/L (ref 4–11)
WBC # CSF MANUAL: 0 /UL (ref 0–5)

## 2018-12-21 ASSESSMENT — MIFFLIN-ST. JEOR: SCORE: 1154.32

## 2018-12-21 ASSESSMENT — PAIN SCALES - GENERAL: PAINLEVEL: NO PAIN (0)

## 2018-12-21 NOTE — LETTER
2018       RE: Rohini Vo  [address]  [facility] [address]     Dear Colleague,    Thank you for referring your patient, Rohini Vo, to the [facility] NEUROLOGY at [facility]. Please see a copy of my visit note below.    [facility] NEUROLOGY  [address] [facility]  [address] [facility] [address]  Dept: [phone]  ______________________________________________________________________________    Patient: Rohini Vo   : 1970   MRN: [id number]   2018    INVASIVE PROCEDURE SAFETY CHECKLIST    Date: 2018   Procedure:lumbar puncture  Patient Name: Rohini Vo  MRN: [id number]  YOB: 1970    Action: Complete sections as appropriate. Any discrepancy results in a HARD COPY until resolved.     PRE PROCEDURE:  Patient ID verified with 2 identifiers (name and  or MRN): Yes  Procedure and site verified with patient/designee (when able): Yes  Accurate consent documentation in medical record: Yes  H&P (or appropriate assessment) documented in medical record: Yes  H&P must be up to 20 days prior to procedure and updates within 24 hours of procedure as applicable: NA  Relevant diagnostic and radiology test results appropriately labeled and displayed as applicable: NA  Procedure site(s) marked with provider initials: NA    TIMEOUT:  Time-Out performed immediately prior to starting procedure, including verbal and active participation of all team members addressing the following:Yes  * Correct patient identify  * Confirmed that the correct side and site are marked  * An accurate procedure consent form  * Agreement on the procedure to be done  * Correct patient position  * Relevant images and results are properly labeled and appropriately displayed  * The need to administer antibiotics or fluids for irrigation purposes during the procedure as applicable   * Safety precautions based on patient history or medication  use    DURING PROCEDURE: Verification of correct person, site, and procedures any time the responsibility for care of the patient is transferred to another member of the care team.         PURPOSE OF VISIT: Diagnostic lumbar puncture.   REQUESTING PHYSICIAN: Dr Davies  INDICATION: Clinical evaluation of encephalopathy  CONTRAINDICATIONS: This individual has no sensitivity to topical iodine or allergy to local anesthetic. She is not on any anticoagulation medications. She has no history of bleeding diathesis or coagulopathy.   CONSENT: This was obtained directly from the patient as documented on the [facility] Affirmation of Consent Surgery or Invasive Procedure.   PATIENT SAFETY: Invasive procedure safety check was successfully completed.   : Brenda Arnett CNP  LABS REVIEWED:   Ref. Range 12/21/2018 08:43   WBC Latest Ref Range: 4.0 - 11.0 10e9/L 5.1   Hemoglobin Latest Ref Range: 11.7 - 15.7 g/dL 13.5   Hematocrit Latest Ref Range: 35.0 - 47.0 % 41.7   Platelet Count Latest Ref Range: 150 - 450 10e9/L 225   RBC Count Latest Ref Range: 3.8 - 5.2 10e12/L 4.36   MCV Latest Ref Range: 78 - 100 fl 96   MCH Latest Ref Range: 26.5 - 33.0 pg 31.0   MCHC Latest Ref Range: 31.5 - 36.5 g/dL 32.4   RDW Latest Ref Range: 10.0 - 15.0 % 13.7   INR Latest Ref Range: 0.86 - 1.14  0.92   PTT Latest Ref Range: 22 - 37 sec 25     DESCRIPTION: Rohini Vo was positioned in the left lateral decubitus. The lumbar skin was prepared with an iodine-containing solution at the mid lumbar level. Three cubic centimeters of 1% Lidocaine was infiltrated locally at the L4-L5 interspace level. The lumbar subarachnoid space was  entered on the first pass by utilizing a 3-1/2 inch, 22 gauge, pencil-point Nelson needle.  There was an initial show of clear CSF.  The opening pressure was 8 cm CSF. The CSF meniscus exhibited good respiratory and cardiac dynamics. Nine milliliters of spinal fluid was removed and sent to the  lab for tests.   Dr. Davies requested CSF: protein, glucose, cell count with differential, ACE, cytology, oligoclonal bands, myelin basic protein, B.Burgdorferi, Lyme IgG and IgM, CSF culture, gram stain.   COMPLICATIONS: None were identified.   HEALTH LITERACY: The concept and management of post lumbar puncture headache were discussed with the patient   DISPOSITION: Study results will be conveyed to the patient by Dr. Davies    This procedure was performed under a hospital privileging agreement with Dr. Steel, Neurologist     Brenda RUST, [facility]  Neurology Clinic

## 2018-12-21 NOTE — PROGRESS NOTES
[facility] NEUROLOGY  [address] [place] [id number]  Dept: [phone]  ______________________________________________________________________________    Patient: Rohini Vo   : 1970   MRN: [id number]   2018    INVASIVE PROCEDURE SAFETY CHECKLIST    Date: 2018   Procedure:lumbar puncture  Patient Name: Rohini Vo  MRN: [id number]  YOB: 1970    Action: Complete sections as appropriate. Any discrepancy results in a HARD COPY until resolved.     PRE PROCEDURE:  Patient ID verified with 2 identifiers (name and  or MRN): Yes  Procedure and site verified with patient/designee (when able): Yes  Accurate consent documentation in medical record: Yes  H&P (or appropriate assessment) documented in medical record: Yes  H&P must be up to 20 days prior to procedure and updates within 24 hours of procedure as applicable: NA  Relevant diagnostic and radiology test results appropriately labeled and displayed as applicable: NA  Procedure site(s) marked with provider initials: NA    TIMEOUT:  Time-Out performed immediately prior to starting procedure, including verbal and active participation of all team members addressing the following:Yes  * Correct patient identify  * Confirmed that the correct side and site are marked  * An accurate procedure consent form  * Agreement on the procedure to be done  * Correct patient position  * Relevant images and results are properly labeled and appropriately displayed  * The need to administer antibiotics or fluids for irrigation purposes during the procedure as applicable   * Safety precautions based on patient history or medication use    DURING PROCEDURE: Verification of correct person, site, and procedures any time the responsibility for care of the patient is transferred to another member of the care team.         PURPOSE OF VISIT: Diagnostic lumbar puncture.   REQUESTING PHYSICIAN: Dr Davies  INDICATION:  Clinical evaluation of encephalopathy  CONTRAINDICATIONS: This individual has no sensitivity to topical iodine or allergy to local anesthetic. She is not on any anticoagulation medications. She has no history of bleeding diathesis or coagulopathy.   CONSENT: This was obtained directly from the patient as documented on the [facility] Affirmation of Consent Surgery or Invasive Procedure.   PATIENT SAFETY: Invasive procedure safety check was successfully completed.   : Brenda Arnett CNP  LABS REVIEWED:   Ref. Range 12/21/2018 08:43   WBC Latest Ref Range: 4.0 - 11.0 10e9/L 5.1   Hemoglobin Latest Ref Range: 11.7 - 15.7 g/dL 13.5   Hematocrit Latest Ref Range: 35.0 - 47.0 % 41.7   Platelet Count Latest Ref Range: 150 - 450 10e9/L 225   RBC Count Latest Ref Range: 3.8 - 5.2 10e12/L 4.36   MCV Latest Ref Range: 78 - 100 fl 96   MCH Latest Ref Range: 26.5 - 33.0 pg 31.0   MCHC Latest Ref Range: 31.5 - 36.5 g/dL 32.4   RDW Latest Ref Range: 10.0 - 15.0 % 13.7   INR Latest Ref Range: 0.86 - 1.14  0.92   PTT Latest Ref Range: 22 - 37 sec 25     DESCRIPTION: Rohini Vo was positioned in the left lateral decubitus. The lumbar skin was prepared with an iodine-containing solution at the mid lumbar level. Three cubic centimeters of 1% Lidocaine was infiltrated locally at the L4-L5 interspace level. The lumbar subarachnoid space was  entered on the first pass by utilizing a 3-1/2 inch, 22 gauge, pencil-point Nelson needle.  There was an initial show of clear CSF.  The opening pressure was 8 cm CSF. The CSF meniscus exhibited good respiratory and cardiac dynamics. Nine milliliters of spinal fluid was removed and sent to the lab for tests.   Dr. Davies requested CSF: protein, glucose, cell count with differential, ACE, cytology, oligoclonal bands, myelin basic protein, B.Burgdorferi, Lyme IgG and IgM, CSF culture, gram stain.   COMPLICATIONS: None were identified.   HEALTH LITERACY: The concept and  management of post lumbar puncture headache were discussed with the patient   DISPOSITION: Study results will be conveyed to the patient by Dr. Davies    This procedure was performed under a hospital privileging agreement with Dr. Steel, Neurologist     Brenda RUST, [facility]  Neurology Clinic

## 2018-12-23 LAB — ACE CSF-CCNC: 1.1 U/L (ref 0–2.5)

## 2018-12-24 LAB
ALB CSF/SERPL: 3.5 RATIO (ref 0–9)
ALBUMIN CSF-MCNC: 13 MG/DL (ref 0–35)
ALBUMIN SERPL-MCNC: 3750 MG/DL (ref 3500–5200)
IGG CSF-MCNC: 2.2 MG/DL (ref 0–6)
IGG SERPL-MCNC: 1220 MG/DL (ref 768–1632)
IGG SYNTH RATE SER+CSF CALC-MRATE: <0 MG/D
IGG/ALB CLEAR SER+CSF-RTO: 0.52 RATIO (ref 0.28–0.66)
IGG/ALB CSF: 0.17 RATIO (ref 0.09–0.25)
OLIGOCLONAL BANDS CSF ELPH-IMP: NEGATIVE
OLIGOCLONAL BANDS CSF ELPH-IMP: NORMAL
OLIGOCLONAL BANDS CSF IEF: 0 BANDS (ref 0–1)

## 2018-12-25 LAB
B BURGDOR IGG CSF QL IB: NEGATIVE
B BURGDOR IGM CSF QL IB: NEGATIVE

## 2023-07-13 ENCOUNTER — TRANSCRIBE ORDERS (OUTPATIENT)
Dept: OTHER | Age: 53
End: 2023-07-13

## 2023-07-13 DIAGNOSIS — R26.89 BALANCE PROBLEMS: ICD-10-CM

## 2023-07-13 DIAGNOSIS — F70 MILD MENTAL SLOWING: ICD-10-CM

## 2023-07-13 DIAGNOSIS — M62.81 GENERALIZED MUSCLE WEAKNESS: ICD-10-CM

## 2023-07-13 DIAGNOSIS — R53.83 EASY FATIGABILITY: Primary | ICD-10-CM

## (undated) RX ORDER — LIDOCAINE HYDROCHLORIDE 10 MG/ML
INJECTION, SOLUTION EPIDURAL; INFILTRATION; INTRACAUDAL; PERINEURAL
Status: DISPENSED
Start: 2018-12-21